# Patient Record
Sex: FEMALE | Race: OTHER | Employment: STUDENT | URBAN - METROPOLITAN AREA
[De-identification: names, ages, dates, MRNs, and addresses within clinical notes are randomized per-mention and may not be internally consistent; named-entity substitution may affect disease eponyms.]

---
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
## 2021-05-05 ENCOUNTER — OFFICE VISIT (OUTPATIENT)
Dept: INTERNAL MEDICINE CLINIC | Facility: CLINIC | Age: 21
End: 2021-05-05
Payer: MEDICAID

## 2021-05-05 VITALS
HEIGHT: 64 IN | OXYGEN SATURATION: 90 % | WEIGHT: 174.19 LBS | DIASTOLIC BLOOD PRESSURE: 88 MMHG | BODY MASS INDEX: 29.74 KG/M2 | SYSTOLIC BLOOD PRESSURE: 138 MMHG | HEART RATE: 70 BPM

## 2021-05-05 DIAGNOSIS — N64.4 BREAST TENDERNESS IN FEMALE: Primary | ICD-10-CM

## 2021-05-05 DIAGNOSIS — N92.6 IRREGULAR MENSES: ICD-10-CM

## 2021-05-05 PROCEDURE — 3008F BODY MASS INDEX DOCD: CPT | Performed by: NURSE PRACTITIONER

## 2021-05-05 PROCEDURE — 99213 OFFICE O/P EST LOW 20 MIN: CPT | Performed by: NURSE PRACTITIONER

## 2021-05-05 PROCEDURE — 3079F DIAST BP 80-89 MM HG: CPT | Performed by: NURSE PRACTITIONER

## 2021-05-05 PROCEDURE — 3075F SYST BP GE 130 - 139MM HG: CPT | Performed by: NURSE PRACTITIONER

## 2021-05-05 NOTE — PATIENT INSTRUCTIONS
Hormones and Your Menstrual Cycle  A woman's monthly period (menstrual cycle) is controlled by changing levels of certain hormones. These hormones travel through the blood. Two hormones, estrogen and progesterone, play a big role in the menstrual cycle. supports your breasts, making them feel firm. Lobules (mammary glands). They produce milk during pregnancy and breastfeeding. Ducts. Tubes that carry milk from the lobules during breastfeeding. Fatty tissue.  This fills the spaces around the ducts and lo

## 2021-05-05 NOTE — PROGRESS NOTES
Chief Complaint:   Patient presents with:  Breast Problem: Breast pain, soreness       HPI:   This is a 22year old female coming in for breast soreness starting last Tuesday. States both breasts were sore but L>R.  R breast no longer sore, L remains tender %    MCV 92.0 80.0 - 100.0 fL    MCH 29.0 26.0 - 34.0 pg    MCHC 31.5 31.0 - 37.0 g/dL    RDW-SD 45.2 35.1 - 46.3 fL    RDW 13.4 11.0 - 15.0 %    .0 150.0 - 450.0 10(3)uL    Neutrophil Absolute Prelim 2.31 1.50 - 7.70 x10 (3) uL    Neutrophil Absolu following:    Height as of this encounter: 5' 4\" (1.626 m). Weight as of this encounter: 174 lb 3.2 oz (79 kg). Vital signs reviewed. Appears stated age, well groomed, in no acute distress.   Physical Exam:  GEN:  Patient is alert, awake and oriented,

## 2021-06-09 ENCOUNTER — OFFICE VISIT (OUTPATIENT)
Dept: INTERNAL MEDICINE CLINIC | Facility: CLINIC | Age: 21
End: 2021-06-09
Payer: MEDICAID

## 2021-06-09 ENCOUNTER — HOSPITAL ENCOUNTER (OUTPATIENT)
Dept: GENERAL RADIOLOGY | Facility: HOSPITAL | Age: 21
Discharge: HOME | End: 2021-06-09
Attending: NURSE PRACTITIONER
Payer: MEDICAID

## 2021-06-09 VITALS
WEIGHT: 169.38 LBS | BODY MASS INDEX: 28.92 KG/M2 | DIASTOLIC BLOOD PRESSURE: 80 MMHG | SYSTOLIC BLOOD PRESSURE: 118 MMHG | HEIGHT: 64 IN

## 2021-06-09 DIAGNOSIS — R10.84 GENERALIZED ABDOMINAL PAIN: ICD-10-CM

## 2021-06-09 DIAGNOSIS — R63.0 LOSS OF APPETITE: Primary | ICD-10-CM

## 2021-06-09 DIAGNOSIS — K59.01 SLOW TRANSIT CONSTIPATION: ICD-10-CM

## 2021-06-09 DIAGNOSIS — R11.0 NAUSEA: ICD-10-CM

## 2021-06-09 DIAGNOSIS — G44.209 TENSION HEADACHE: ICD-10-CM

## 2021-06-09 DIAGNOSIS — R63.0 LOSS OF APPETITE: ICD-10-CM

## 2021-06-09 DIAGNOSIS — R30.0 DYSURIA: ICD-10-CM

## 2021-06-09 PROCEDURE — 87086 URINE CULTURE/COLONY COUNT: CPT | Performed by: NURSE PRACTITIONER

## 2021-06-09 PROCEDURE — 81003 URINALYSIS AUTO W/O SCOPE: CPT | Performed by: NURSE PRACTITIONER

## 2021-06-09 PROCEDURE — 83036 HEMOGLOBIN GLYCOSYLATED A1C: CPT | Performed by: NURSE PRACTITIONER

## 2021-06-09 PROCEDURE — 74018 RADEX ABDOMEN 1 VIEW: CPT | Performed by: NURSE PRACTITIONER

## 2021-06-09 PROCEDURE — 3079F DIAST BP 80-89 MM HG: CPT | Performed by: NURSE PRACTITIONER

## 2021-06-09 PROCEDURE — 99214 OFFICE O/P EST MOD 30 MIN: CPT | Performed by: NURSE PRACTITIONER

## 2021-06-09 PROCEDURE — 85025 COMPLETE CBC W/AUTO DIFF WBC: CPT | Performed by: NURSE PRACTITIONER

## 2021-06-09 PROCEDURE — 80053 COMPREHEN METABOLIC PANEL: CPT | Performed by: NURSE PRACTITIONER

## 2021-06-09 PROCEDURE — 3008F BODY MASS INDEX DOCD: CPT | Performed by: NURSE PRACTITIONER

## 2021-06-09 PROCEDURE — 3074F SYST BP LT 130 MM HG: CPT | Performed by: NURSE PRACTITIONER

## 2021-06-09 RX ORDER — NITROFURANTOIN 25; 75 MG/1; MG/1
100 CAPSULE ORAL 2 TIMES DAILY
Qty: 10 CAPSULE | Refills: 0 | Status: SHIPPED | OUTPATIENT
Start: 2021-06-09 | End: 2021-06-14

## 2021-06-09 NOTE — PROGRESS NOTES
Chief Complaint:   Patient presents with:  Headache  Loss Of Appetite: Pt gets hungry but once she starts eating she cant eat  Dysuria      HPI:   This is a 22year old female coming in for multiple concerns.  States she has had headaches in both temples of History:  History reviewed. No pertinent family history.   Allergies:  No Known Allergies  Current Meds:  Current Outpatient Medications   Medication Sig Dispense Refill   • Nitrofurantoin Monohyd Macro 100 MG Oral Cap Take 1 capsule (100 mg total) by mouth turgor. HEART:  Regular rate and rhythm, no murmurs, rubs or gallops. LUNGS: Clear to auscultation bilterally, no rales/rhonchi/wheezing. ABDOMEN:  Soft, nondistended, bowel sounds normal in all 4 quadrants, no masses, no hepatosplenomegaly.  Positive fo Macrobid BID x 5 days. Recommended plenty of PO fluids, voiding every 1-2 hours while awake. -Consider renal stone in differential. Will also check urine culture.  To ER with flank pain/blood in urine, no urine output.   - URINALYSIS, AUTO, W/O SCOPE  - UR

## 2021-06-09 NOTE — PATIENT INSTRUCTIONS
Unknown Causes of Abdominal Pain (Female)    The exact cause of your belly (abdominal) pain is not clear. This does not mean that this is something to worry about. Everyone likes to know the exact cause of the problem.  But sometimes with belly pain, ther worse. Take liquids in small amounts. Don’t guzzle them. · Caffeine sometimes makes the pain and cramping worse. · Don’t take dairy products if you have vomiting or diarrhea. · Don't eat large amounts at a time. Wait a few minutes between bites.   · Eat times per week. Or that you strain to pass hard, dry stool. It can last a short time. Or it can be a problem that never seems to go away. The good news is that it can often be treated and controlled.    Eat more fiber  One of the best ways to help treat con Headache     A muscle tension headache is a very common cause of head pain. It’s also called a stress headache. When some people are under stress, they tense the muscles of their shoulder, neck, and scalp without knowing it.  If this tension lasts long enou away if any of these occur:  · Your head pain gets worse during sexual intercourse or strenuous activity  · Your head pain doesn’t get better within 24 hours  · You aren’t able to keep liquids down (repeated vomiting)  · Fever of 100.4ºF (38ºC) or higher,

## 2021-06-16 ENCOUNTER — OFFICE VISIT (OUTPATIENT)
Dept: INTERNAL MEDICINE CLINIC | Facility: CLINIC | Age: 21
End: 2021-06-16
Payer: MEDICAID

## 2021-06-16 VITALS
OXYGEN SATURATION: 98 % | WEIGHT: 171 LBS | SYSTOLIC BLOOD PRESSURE: 124 MMHG | BODY MASS INDEX: 29.19 KG/M2 | DIASTOLIC BLOOD PRESSURE: 72 MMHG | HEIGHT: 64 IN | HEART RATE: 72 BPM

## 2021-06-16 DIAGNOSIS — R19.5 LOOSE STOOLS: ICD-10-CM

## 2021-06-16 DIAGNOSIS — Z87.440 HISTORY OF UTI: Primary | ICD-10-CM

## 2021-06-16 PROCEDURE — 3078F DIAST BP <80 MM HG: CPT | Performed by: NURSE PRACTITIONER

## 2021-06-16 PROCEDURE — 3008F BODY MASS INDEX DOCD: CPT | Performed by: NURSE PRACTITIONER

## 2021-06-16 PROCEDURE — 99213 OFFICE O/P EST LOW 20 MIN: CPT | Performed by: NURSE PRACTITIONER

## 2021-06-16 PROCEDURE — 3074F SYST BP LT 130 MM HG: CPT | Performed by: NURSE PRACTITIONER

## 2021-06-16 NOTE — PROGRESS NOTES
Chief Complaint:   Patient presents with: Follow - Up: Patient has been feling better. Did note that she had some burning with urination yesterday but has stopped since       HPI:   This is a 22year old female coming in for follow-up.  Patient was seen 6/ Total 0.5 0.1 - 2.0 mg/dL    Total Protein 8.1 6.4 - 8.2 g/dL    Albumin 3.8 3.4 - 5.0 g/dL    Globulin  4.3 2.8 - 4.4 g/dL    A/G Ratio 0.9 (L) 1.0 - 2.0    FASTING No    [facility] POCT PREGNANCY URINE MANUAL   Result Value Ref Range    POCT Urine Pregnancy Neg lesion. CARDIOVASCULAR:  Denies chest pain, palpitations, edema, dyspnea on exertion or at rest.  RESPIRATORY:  Denies shortness of breath, wheezing, cough or sputum.   GASTROINTESTINAL:  Denies abdominal pain, nausea, vomiting, constipation, or blood in s Active Problem List:     Irregular menses      Mary Ann Obando, APRN  6/16/2021  9:33 AM

## 2021-06-27 ENCOUNTER — OFFICE VISIT (OUTPATIENT)
Dept: FAMILY MEDICINE CLINIC | Facility: CLINIC | Age: 21
End: 2021-06-27
Payer: MEDICAID

## 2021-06-27 VITALS
TEMPERATURE: 98 F | DIASTOLIC BLOOD PRESSURE: 80 MMHG | HEIGHT: 64 IN | BODY MASS INDEX: 29.19 KG/M2 | RESPIRATION RATE: 16 BRPM | HEART RATE: 80 BPM | OXYGEN SATURATION: 98 % | WEIGHT: 171 LBS | SYSTOLIC BLOOD PRESSURE: 130 MMHG

## 2021-06-27 DIAGNOSIS — N30.01 ACUTE CYSTITIS WITH HEMATURIA: Primary | ICD-10-CM

## 2021-06-27 DIAGNOSIS — R30.0 DYSURIA: ICD-10-CM

## 2021-06-27 PROCEDURE — 81003 URINALYSIS AUTO W/O SCOPE: CPT | Performed by: PHYSICIAN ASSISTANT

## 2021-06-27 PROCEDURE — 99213 OFFICE O/P EST LOW 20 MIN: CPT | Performed by: PHYSICIAN ASSISTANT

## 2021-06-27 PROCEDURE — 87086 URINE CULTURE/COLONY COUNT: CPT | Performed by: PHYSICIAN ASSISTANT

## 2021-06-27 PROCEDURE — 81025 URINE PREGNANCY TEST: CPT | Performed by: PHYSICIAN ASSISTANT

## 2021-06-27 PROCEDURE — 3075F SYST BP GE 130 - 139MM HG: CPT | Performed by: PHYSICIAN ASSISTANT

## 2021-06-27 PROCEDURE — 3079F DIAST BP 80-89 MM HG: CPT | Performed by: PHYSICIAN ASSISTANT

## 2021-06-27 PROCEDURE — 3008F BODY MASS INDEX DOCD: CPT | Performed by: PHYSICIAN ASSISTANT

## 2021-06-27 RX ORDER — SULFAMETHOXAZOLE AND TRIMETHOPRIM 800; 160 MG/1; MG/1
1 TABLET ORAL 2 TIMES DAILY
Qty: 10 TABLET | Refills: 0 | Status: SHIPPED | OUTPATIENT
Start: 2021-06-27 | End: 2021-07-02

## 2021-06-27 NOTE — PROGRESS NOTES
CHIEF COMPLAINT:     Patient presents with:  Urinary       HPI:    Tracey Shelton is a 22year old female who presents with symptoms of UTI. Complaining of urinary frequency and urgency for last week, dysuria and frequency increased day.  Symptoms have bee Glucose Urine Negative Negative mg/dL    Bilirubin Urine Small (A) Negative    Ketones, UA Negative Negative - Trace mg/dL    Spec Gravity 1.030 1.005 - 1.030    Blood Urine Small (A) Negative    PH Urine 5.5 5.0 - 8.0    Protein Urine Trace Negative - Tra

## 2022-01-04 ENCOUNTER — OFFICE VISIT (OUTPATIENT)
Dept: INTERNAL MEDICINE CLINIC | Facility: CLINIC | Age: 22
End: 2022-01-04
Payer: MEDICAID

## 2022-01-04 VITALS
WEIGHT: 189 LBS | BODY MASS INDEX: 32.27 KG/M2 | RESPIRATION RATE: 17 BRPM | OXYGEN SATURATION: 99 % | DIASTOLIC BLOOD PRESSURE: 92 MMHG | SYSTOLIC BLOOD PRESSURE: 136 MMHG | HEART RATE: 86 BPM | HEIGHT: 64 IN

## 2022-01-04 DIAGNOSIS — Z00.00 ANNUAL PHYSICAL EXAM: Primary | ICD-10-CM

## 2022-01-04 DIAGNOSIS — Z11.1 TUBERCULOSIS SCREENING: ICD-10-CM

## 2022-01-04 DIAGNOSIS — Z23 NEED FOR INFLUENZA VACCINATION: ICD-10-CM

## 2022-01-04 PROCEDURE — 90472 IMMUNIZATION ADMIN EACH ADD: CPT | Performed by: INTERNAL MEDICINE

## 2022-01-04 PROCEDURE — 90686 IIV4 VACC NO PRSV 0.5 ML IM: CPT | Performed by: INTERNAL MEDICINE

## 2022-01-04 PROCEDURE — 3008F BODY MASS INDEX DOCD: CPT | Performed by: INTERNAL MEDICINE

## 2022-01-04 PROCEDURE — 3080F DIAST BP >= 90 MM HG: CPT | Performed by: INTERNAL MEDICINE

## 2022-01-04 PROCEDURE — 90471 IMMUNIZATION ADMIN: CPT | Performed by: INTERNAL MEDICINE

## 2022-01-04 PROCEDURE — 3075F SYST BP GE 130 - 139MM HG: CPT | Performed by: INTERNAL MEDICINE

## 2022-01-04 PROCEDURE — 99395 PREV VISIT EST AGE 18-39: CPT | Performed by: INTERNAL MEDICINE

## 2022-01-04 PROCEDURE — 90715 TDAP VACCINE 7 YRS/> IM: CPT | Performed by: INTERNAL MEDICINE

## 2022-01-04 NOTE — PROGRESS NOTES
Brittny Lucas is a 22year old female.     Chief complaint: annual physical exam   HPI:     Brittny Lucas is a 22year old pleasant female who presents for annual physical exam           No chest pain no sob no abdominal pain  No diarrhea or constipatio CHLAMYDIA         (NISSERIA) GONORRHOEAE DNA, SDA, URINALYSIS         WITH CULTURE REFLEX, QUANTIFERON TB GOLD (IN         TUBE), TETANUS, DIPHTHERIA TOXOIDS AND         ACELLULAR PERTUSIS VACCINE (TDAP), >7 YEARS, IM        USE    (Z23) Need for influenza

## 2022-01-06 LAB
ABSOLUTE BASOPHILS: 31 CELLS/UL (ref 0–200)
ABSOLUTE EOSINOPHILS: 73 CELLS/UL (ref 15–500)
ABSOLUTE LYMPHOCYTES: 1643 CELLS/UL (ref 850–3900)
ABSOLUTE MONOCYTES: 946 CELLS/UL (ref 200–950)
ABSOLUTE NEUTROPHILS: 2506 CELLS/UL (ref 1500–7800)
ALBUMIN/GLOBULIN RATIO: 1.3 (CALC) (ref 1–2.5)
ALBUMIN: 4.3 G/DL (ref 3.6–5.1)
ALKALINE PHOSPHATASE: 47 U/L (ref 31–125)
ALT: 19 U/L (ref 6–29)
AST: 20 U/L (ref 10–30)
BASOPHILS: 0.6 %
BILIRUBIN, TOTAL: 0.3 MG/DL (ref 0.2–1.2)
BILIRUBIN: NEGATIVE
BILIRUBIN: NEGATIVE
BUN: 10 MG/DL (ref 7–25)
CALCIUM: 9.3 MG/DL (ref 8.6–10.2)
CARBON DIOXIDE: 26 MMOL/L (ref 20–32)
CHLAMYDIA TRACHOMATIS$RNA, TMA: NOT DETECTED
CHLAMYDIA TRACHOMATIS$RNA, TMA: NOT DETECTED
CHLORIDE: 104 MMOL/L (ref 98–110)
CHOL/HDLC RATIO: 3.2 (CALC)
CHOLESTEROL, TOTAL: 198 MG/DL
COLOR: YELLOW
COLOR: YELLOW
CREATININE: 0.68 MG/DL (ref 0.5–1.1)
EGFR IF AFRICN AM: 146 ML/MIN/1.73M2
EGFR IF NONAFRICN AM: 126 ML/MIN/1.73M2
EOSINOPHILS: 1.4 %
GLOBULIN: 3.4 G/DL (CALC) (ref 1.9–3.7)
GLUCOSE: 83 MG/DL (ref 65–99)
GLUCOSE: NEGATIVE
GLUCOSE: NEGATIVE
HDL CHOLESTEROL: 62 MG/DL
HEMATOCRIT: 39.7 % (ref 35–45)
HEMOGLOBIN: 12.8 G/DL (ref 11.7–15.5)
KETONES: NEGATIVE
LDL-CHOLESTEROL: 116 MG/DL (CALC)
LEUKOCYTE ESTERASE: NEGATIVE
LYMPHOCYTES: 31.6 %
MCH: 28.1 PG (ref 27–33)
MCHC: 32.2 G/DL (ref 32–36)
MCV: 87.1 FL (ref 80–100)
MITOGEN-NIL: >10 IU/ML
MONOCYTES: 18.2 %
MPV: 9.4 FL (ref 7.5–12.5)
NEISSERIA GONORRHOEAE$RNA, TMA: NOT DETECTED
NEISSERIA GONORRHOEAE$RNA, TMA: NOT DETECTED
NEUTROPHILS: 48.2 %
NIL: 0.07 IU/ML
NITRITE: NEGATIVE
NITRITE: NEGATIVE
NON-HDL CHOLESTEROL: 136 MG/DL (CALC)
OCCULT BLOOD: NEGATIVE
OCCULT BLOOD: NEGATIVE
PH: 6 (ref 5–8)
PLATELET COUNT: 297 THOUSAND/UL (ref 140–400)
POTASSIUM: 3.9 MMOL/L (ref 3.5–5.3)
PROTEIN, TOTAL: 7.7 G/DL (ref 6.1–8.1)
PROTEIN: NEGATIVE
PROTEIN: NEGATIVE
QUANTIFERON(R)-TB GOLD PLUS, 1 TUBE: NEGATIVE
RDW: 13 % (ref 11–15)
RED BLOOD CELL COUNT: 4.56 MILLION/UL (ref 3.8–5.1)
SODIUM: 137 MMOL/L (ref 135–146)
SPECIFIC GRAVITY: 1.02 (ref 1–1.03)
SPECIFIC GRAVITY: 1.03 (ref 1–1.03)
T4, FREE: 1.2 NG/DL (ref 0.8–1.4)
TB1-NIL: 0.01 IU/ML
TB2-NIL: 0 IU/ML
TRIGLYCERIDES: 101 MG/DL
TSH W/REFLEX TO FT4: 0.33 MIU/L
WHITE BLOOD CELL COUNT: 5.2 THOUSAND/UL (ref 3.8–10.8)

## 2022-01-07 DIAGNOSIS — R79.89 ABNORMAL THYROID BLOOD TEST: Primary | ICD-10-CM

## 2022-01-07 RX ORDER — SULFAMETHOXAZOLE AND TRIMETHOPRIM 800; 160 MG/1; MG/1
1 TABLET ORAL 2 TIMES DAILY
Qty: 6 TABLET | Refills: 0 | Status: SHIPPED | OUTPATIENT
Start: 2022-01-07 | End: 2022-01-10

## 2022-01-12 ENCOUNTER — PATIENT MESSAGE (OUTPATIENT)
Dept: INTERNAL MEDICINE CLINIC | Facility: CLINIC | Age: 22
End: 2022-01-12

## 2022-01-12 DIAGNOSIS — Z01.84 IMMUNITY STATUS TESTING: Primary | ICD-10-CM

## 2022-01-13 ENCOUNTER — TELEPHONE (OUTPATIENT)
Dept: INTERNAL MEDICINE CLINIC | Facility: CLINIC | Age: 22
End: 2022-01-13

## 2022-01-14 ENCOUNTER — NURSE ONLY (OUTPATIENT)
Dept: INTERNAL MEDICINE CLINIC | Facility: CLINIC | Age: 22
End: 2022-01-14
Payer: MEDICAID

## 2022-01-14 PROCEDURE — 90746 HEPB VACCINE 3 DOSE ADULT IM: CPT | Performed by: INTERNAL MEDICINE

## 2022-01-14 PROCEDURE — 90471 IMMUNIZATION ADMIN: CPT | Performed by: INTERNAL MEDICINE

## 2022-01-17 NOTE — PROGRESS NOTES
Confirmed  & full name, Pt was given the Hep B vaccine LD. Tolerated vaccine well.      Christian De

## 2022-01-18 NOTE — TELEPHONE ENCOUNTER
Alisa Andersen, [place] 1/14/2022 9:03 AM CST      ----- Message -----  From: Leah Munguia  Sent: 1/14/2022 8:50 AM CST  To: [facility] Clinical Staff  Subject: Results of lab work of antibodies     Sorry for not being clear.  However, yes that’s what I was try

## (undated) NOTE — ED AVS SNAPSHOT
Emelyn Salinas   MRN: [id number]    Department:  [facility] Emergency Department   Date of Visit:  9/22/2017           Disclosure     Insurance plans vary and the physician(s) referred by the ER may not be covered by your plan.  Please contact CARE PHYSICIAN AT ONCE OR RETURN IMMEDIATELY TO THE EMERGENCY DEPARTMENT. If you have been prescribed any medication(s), please fill your prescription right away and begin taking the medication(s) as directed.   If you believe that any of the medications

## (undated) NOTE — ED AVS SNAPSHOT
Wilberto Brower   MRN: [id number]    Department:  [facility] Emergency Department   Date of Visit:  8/26/2018           Disclosure     Insurance plans vary and the physician(s) referred by the ER may not be covered by your plan.  Please contact CARE PHYSICIAN AT ONCE OR RETURN IMMEDIATELY TO THE EMERGENCY DEPARTMENT. If you have been prescribed any medication(s), please fill your prescription right away and begin taking the medication(s) as directed.   If you believe that any of the medications